# Patient Record
Sex: MALE | Race: BLACK OR AFRICAN AMERICAN | URBAN - METROPOLITAN AREA
[De-identification: names, ages, dates, MRNs, and addresses within clinical notes are randomized per-mention and may not be internally consistent; named-entity substitution may affect disease eponyms.]

---

## 2022-10-04 ENCOUNTER — OFFICE VISIT (OUTPATIENT)
Dept: URBAN - METROPOLITAN AREA CLINIC 86 | Facility: CLINIC | Age: 57
End: 2022-10-04

## 2022-10-04 ENCOUNTER — WEB ENCOUNTER (OUTPATIENT)
Dept: URBAN - METROPOLITAN AREA CLINIC 86 | Facility: CLINIC | Age: 57
End: 2022-10-04

## 2022-10-04 VITALS
HEART RATE: 131 BPM | BODY MASS INDEX: 21.35 KG/M2 | SYSTOLIC BLOOD PRESSURE: 90 MMHG | DIASTOLIC BLOOD PRESSURE: 67 MMHG | WEIGHT: 136 LBS | HEIGHT: 67 IN | TEMPERATURE: 96.4 F

## 2022-10-04 DIAGNOSIS — R16.0 HEPATOMEGALY: ICD-10-CM

## 2022-10-04 DIAGNOSIS — Z71.89 VACCINE COUNSELING: ICD-10-CM

## 2022-10-04 DIAGNOSIS — F10.10 ALCOHOL ABUSE: ICD-10-CM

## 2022-10-04 DIAGNOSIS — K76.0 FATTY LIVER: ICD-10-CM

## 2022-10-04 DIAGNOSIS — R63.4 WEIGHT LOSS: ICD-10-CM

## 2022-10-04 DIAGNOSIS — R74.8 ELEVATED LIVER ENZYMES: ICD-10-CM

## 2022-10-04 DIAGNOSIS — K92.1 HEMATOCHEZIA: ICD-10-CM

## 2022-10-04 DIAGNOSIS — K85.20 ALCOHOL-INDUCED ACUTE PANCREATITIS, UNSPECIFIED COMPLICATION STATUS: ICD-10-CM

## 2022-10-04 DIAGNOSIS — K70.10 ALCOHOLIC HEPATITIS WITHOUT ASCITES: ICD-10-CM

## 2022-10-04 RX ORDER — ROSUVASTATIN 40 MG/1
TABLET, FILM COATED ORAL
Qty: 30 TABLET | Status: ACTIVE | COMMUNITY

## 2022-10-04 RX ORDER — PANTOPRAZOLE SODIUM 40 MG/1
TABLET, DELAYED RELEASE ORAL
Qty: 30 TABLET | Status: ACTIVE | COMMUNITY

## 2022-10-04 RX ORDER — METOPROLOL SUCCINATE 50 MG/1
TABLET, FILM COATED, EXTENDED RELEASE ORAL
Qty: 30 TABLET | Status: ACTIVE | COMMUNITY

## 2022-10-04 RX ORDER — ONDANSETRON 4 MG/1
TABLET, FILM COATED ORAL
Qty: 18 TABLET | Status: ACTIVE | COMMUNITY

## 2022-10-04 RX ORDER — ESCITALOPRAM 20 MG/1
TABLET, FILM COATED ORAL
Qty: 30 TABLET | Status: ACTIVE | COMMUNITY

## 2022-10-04 RX ORDER — TAMSULOSIN HYDROCHLORIDE 0.4 MG/1
CAPSULE ORAL
Qty: 30 CAPSULE | Status: ACTIVE | COMMUNITY

## 2022-10-04 RX ORDER — FOLIC ACID 1 MG/1
TABLET ORAL
Qty: 30 TABLET | Status: ACTIVE | COMMUNITY

## 2022-10-04 RX ORDER — AMLODIPINE BESYLATE 10 MG/1
TAKE 1 TABLET BY MOUTH EVERY DAY TABLET ORAL
Qty: 30 EACH | Refills: 4 | Status: ACTIVE | COMMUNITY

## 2022-10-04 NOTE — HPI-TODAY'S VISIT:
This is a 57 year old males here for abnormal liver enzymes. Referred by Dr. Rossi Tao   A copy of the note will be sent to the referring provider.   10/4/22 office visit accompanied by his sons Mr. To is a 57-year-old male with a past medical history of depression, alcohol use disorder, pancreatitis, hypertension, CKD stage III.   Alcohol withdrawal history: Recently admitted 3 times in the past year.  April 22 he left AMA.  June 2022 he was admitted for pancreatitis and possible alcohol withdrawal. Recently admitted sept 10, 2022 as well. During this admission he had a maddreys score of 22, no steriods were done.   Abnormal liver enzymes were noted: 9/19/22 labs Labs Creatinine 1.21, sodium 135, potassium 4.4, , , alk phos 339, lipase 487 CBC with a white blood cell count of 11.8 hemoglobin 11.2, platelets 298.   9/2022 Liver: Diffuse increased echogenicity with hepatomegaly.  No lesions.  Portal vein patent.  Hepatic veins are patent.  Common bile duct measures 6 mm.  Gallbladder: Intraluminal gallbladder sludge.  No distention, wall thickening, or inflammation.  Pancreas: Normal. Spleen: normal. Right kidney measures 11.6 cm with normal echogenicity no hydronephrosis.Overall they thought that there is diffuse fatty liver with hepatomegaly  ETOH: none since last week prior drinking pint daily. Drinking alot over the past couple of years.   Patients medication list reviewed and no new meds not on supplements.  FH denies FH of liver disease  Of note he has also had significant weight loss over the past several months and hematochezia and in hospital they recomemnded a colonoscopy outpt

## 2022-10-07 ENCOUNTER — TELEPHONE ENCOUNTER (OUTPATIENT)
Dept: URBAN - METROPOLITAN AREA CLINIC 92 | Facility: CLINIC | Age: 57
End: 2022-10-07

## 2022-10-27 ENCOUNTER — OFFICE VISIT (OUTPATIENT)
Dept: URBAN - METROPOLITAN AREA CLINIC 92 | Facility: CLINIC | Age: 57
End: 2022-10-27

## 2022-10-27 NOTE — HPI-TODAY'S VISIT:
57-year-old male presents today with complaints of pancreatitis, weight loss.  He was seen by liver center on 10-4-2022.  It was noted patient has a history of depression, alcohol use disorder, pancreatitis, hypertension and chronic kidney disease stage III.  He was recently admitted 3 times in the past year for alcohol withdrawal it was at this visit abnormal liver enzymes were noted.  Ultrasound showed diffuse fatty liver with hepatomegaly.  It is noted that he was drinking a pint daily and has been drinking a lot over the past couple of years.  He does not have family history of liver disease he has lost a significant mount of weight over the last several months and has been having hematochezia and it was recommended that patient have outpatient colonoscopy

## 2022-10-28 ENCOUNTER — OFFICE VISIT (OUTPATIENT)
Dept: URBAN - METROPOLITAN AREA CLINIC 92 | Facility: CLINIC | Age: 57
End: 2022-10-28
Payer: COMMERCIAL

## 2022-10-28 VITALS
HEIGHT: 67 IN | SYSTOLIC BLOOD PRESSURE: 112 MMHG | BODY MASS INDEX: 24.96 KG/M2 | DIASTOLIC BLOOD PRESSURE: 81 MMHG | HEART RATE: 115 BPM | TEMPERATURE: 97.1 F | WEIGHT: 159 LBS

## 2022-10-28 DIAGNOSIS — Z12.11 SCREENING FOR COLON CANCER: ICD-10-CM

## 2022-10-28 DIAGNOSIS — R63.4 WEIGHT LOSS: ICD-10-CM

## 2022-10-28 DIAGNOSIS — K85.20 ALCOHOL-INDUCED ACUTE PANCREATITIS, UNSPECIFIED COMPLICATION STATUS: ICD-10-CM

## 2022-10-28 DIAGNOSIS — K70.10 ALCOHOLIC HEPATITIS WITHOUT ASCITES: ICD-10-CM

## 2022-10-28 DIAGNOSIS — R10.13 EPIGASTRIC PAIN: ICD-10-CM

## 2022-10-28 DIAGNOSIS — D64.9 ANEMIA, UNSPECIFIED TYPE: ICD-10-CM

## 2022-10-28 DIAGNOSIS — K92.1 MELENA: ICD-10-CM

## 2022-10-28 DIAGNOSIS — N17.9 ACUTE KIDNEY INJURY: ICD-10-CM

## 2022-10-28 DIAGNOSIS — K86.89 PANCREATIC INSUFFICIENCY: ICD-10-CM

## 2022-10-28 PROCEDURE — 99204 OFFICE O/P NEW MOD 45 MIN: CPT

## 2022-10-28 RX ORDER — ROSUVASTATIN 40 MG/1
TABLET, FILM COATED ORAL
Qty: 30 TABLET | Status: ACTIVE | COMMUNITY

## 2022-10-28 RX ORDER — AMLODIPINE BESYLATE 10 MG/1
TAKE 1 TABLET BY MOUTH EVERY DAY TABLET ORAL
Qty: 30 EACH | Refills: 4 | Status: ACTIVE | COMMUNITY

## 2022-10-28 RX ORDER — ONDANSETRON 4 MG/1
TABLET, FILM COATED ORAL
Qty: 18 TABLET | Status: ACTIVE | COMMUNITY

## 2022-10-28 RX ORDER — FOLIC ACID 1 MG/1
TABLET ORAL
Qty: 30 TABLET | Status: ACTIVE | COMMUNITY

## 2022-10-28 RX ORDER — ESCITALOPRAM 20 MG/1
TABLET, FILM COATED ORAL
Qty: 30 TABLET | Status: ACTIVE | COMMUNITY

## 2022-10-28 RX ORDER — TAMSULOSIN HYDROCHLORIDE 0.4 MG/1
CAPSULE ORAL
Qty: 30 CAPSULE | Status: ACTIVE | COMMUNITY

## 2022-10-28 RX ORDER — PANTOPRAZOLE SODIUM 40 MG/1
TABLET, DELAYED RELEASE ORAL
Qty: 30 TABLET | Status: ACTIVE | COMMUNITY

## 2022-10-28 RX ORDER — METOPROLOL SUCCINATE 50 MG/1
TABLET, FILM COATED, EXTENDED RELEASE ORAL
Qty: 30 TABLET | Status: ACTIVE | COMMUNITY

## 2022-10-28 NOTE — HPI-TODAY'S VISIT:
57-year-old male presents today with complaints of pancreatitis, weight loss.  He was seen by liver center on 10-4-2022.  It was noted patient has a history of depression, alcohol use disorder, pancreatitis, hypertension and chronic kidney disease stage III.  He was recently admitted 3 times in the past year for alcohol withdrawal it was at the last visit(9/20220) abnormal liver enzymes were noted.  Ultrasound showed diffuse fatty liver with hepatomegaly.  It is noted that he was drinking a pint daily and has been drinking a lot over the past couple of years.  He does not have family history of liver disease. He has lost a significant mount of weight over the last several months and has been having hematochezia and it was recommended that patient have outpatient colonoscopy  He most recently presented to [facility] on 10- after his primary care was concerned about acute renal injury.  He was in the hospital until 10-21-22.  Discharge summary notes nephrology was consulted due to hyponatremia, hyperkalemia, dehydration and acute renal injury which had resolved. Patient was told to follow-up with outpatient nephro.  He was also noted to have diarrhea and it was noted that fecal elastase was 113 in June and Creon was given to patient which he states made his diarrhea better.   Stool culture was negative. He was also found to have macrocytic anemia he denied any prior  blood loss. Over the course of hospitalization hemoglobin would fluctuate below and above 7 and he was given 1 unit of blood on 10-20. He is taking thiamine and folate supplementation his last drink was about a month ago and he had no signs of alcohol withdrawal.  He did not note any abdominal pain but did note ongoing diarrhea.  CT of abdomen and pelvis showed no acute findings of pancreatitis.Lipase was elevated at 380 but is better than prior admission in September which was 487.  Last hemoglobin was 8.3, hematocrit 26.2, MCV 95.6, iron 55 ferritin 1200.  Patient has not yet followed up with nephrology and would like a referral today.  He states that he has some epigastric abdominal pain that comes and goes since he got out of the hospital on Friday.  He did note a vomiting episode yesterday but denies hematemesis.  Prior to this last episode he vomited in the hospital.  He denies any nausea, dysphagia, odynophagia, uses NSAIDs rarely, heartburn, regurgitation.  It has been noted that he has been losing weight in the past he states prior to his pancreatitis episodes he weighed 175 and in the hospital weight 140 today he is back up to 159. He states he has a loss of appetite now. He is having a bowel movement every time after he eats and all of his stool are loose.  He was taking Creon in the hospital but has intermittently been taking it when he came home.  He does have his prescription at home when he does take Creon his diarrhea is much better.  Prior to this year he states he would have 1 normal bowel movement daily.  It has been noted in multiple notes that he has had hematochezia in the past but when I asked him today he states he has never seen blood in his stool.  He does state he has some dark stool when he wipes that has been present.  He also notes increased fatigue.  He was given pantoprazole 40 mg which she does not know if he is taking or not but he does he has a prescription at home.

## 2022-10-29 LAB
FERRITIN, SERUM: 709
FOLATE (FOLIC ACID), SERUM: 8.9
HEMATOCRIT: 32
HEMOGLOBIN: 10.6
IRON BIND.CAP.(TIBC): 151
IRON SATURATION: 38
IRON: 57
LIPASE: 209
MCH: 30.6
MCHC: 33.1
MCV: 92.5
MPV: 10.8
PLATELET COUNT: 493
RDW: 14
RED BLOOD CELL COUNT: 3.46
VITAMIN B12: 600
WHITE BLOOD CELL COUNT: 16.3

## 2022-10-31 ENCOUNTER — TELEPHONE ENCOUNTER (OUTPATIENT)
Dept: URBAN - METROPOLITAN AREA CLINIC 92 | Facility: CLINIC | Age: 57
End: 2022-10-31

## 2022-10-31 ENCOUNTER — OFFICE VISIT (OUTPATIENT)
Dept: URBAN - METROPOLITAN AREA SURGERY CENTER 16 | Facility: SURGERY CENTER | Age: 57
End: 2022-10-31

## 2022-10-31 RX ORDER — ROSUVASTATIN 40 MG/1
TABLET, FILM COATED ORAL
Qty: 30 TABLET | Status: ACTIVE | COMMUNITY

## 2022-10-31 RX ORDER — ONDANSETRON 4 MG/1
TABLET, FILM COATED ORAL
Qty: 18 TABLET | Status: ACTIVE | COMMUNITY

## 2022-10-31 RX ORDER — METOPROLOL SUCCINATE 50 MG/1
TABLET, FILM COATED, EXTENDED RELEASE ORAL
Qty: 30 TABLET | Status: ACTIVE | COMMUNITY

## 2022-10-31 RX ORDER — AMLODIPINE BESYLATE 10 MG/1
TAKE 1 TABLET BY MOUTH EVERY DAY TABLET ORAL
Qty: 30 EACH | Refills: 4 | Status: ACTIVE | COMMUNITY

## 2022-10-31 RX ORDER — PANTOPRAZOLE SODIUM 40 MG/1
TABLET, DELAYED RELEASE ORAL
Qty: 30 TABLET | Status: ACTIVE | COMMUNITY

## 2022-10-31 RX ORDER — FOLIC ACID 1 MG/1
TABLET ORAL
Qty: 30 TABLET | Status: ACTIVE | COMMUNITY

## 2022-10-31 RX ORDER — ONDANSETRON HYDROCHLORIDE 8 MG/1
1-2 CAPSULE TABLET, FILM COATED ORAL
Qty: 6 | Refills: 0 | OUTPATIENT
Start: 2022-10-31 | End: 2022-11-01

## 2022-10-31 RX ORDER — ESCITALOPRAM 20 MG/1
TABLET, FILM COATED ORAL
Qty: 30 TABLET | Status: ACTIVE | COMMUNITY

## 2022-10-31 RX ORDER — TAMSULOSIN HYDROCHLORIDE 0.4 MG/1
CAPSULE ORAL
Qty: 30 CAPSULE | Status: ACTIVE | COMMUNITY

## 2022-10-31 RX ORDER — POLYETHYLENE GLYCOL 3350, SODIUM SULFATE ANHYDROUS, SODIUM BICARBONATE, SODIUM CHLORIDE, POTASSIUM CHLORIDE 236; 22.74; 6.74; 5.86; 2.97 G/4L; G/4L; G/4L; G/4L; G/4L
AS DIRECTED POWDER, FOR SOLUTION ORAL ONCE
Qty: 1 UNSPECIFIED | Refills: 0 | OUTPATIENT

## 2022-11-15 ENCOUNTER — LAB OUTSIDE AN ENCOUNTER (OUTPATIENT)
Dept: URBAN - METROPOLITAN AREA CLINIC 86 | Facility: CLINIC | Age: 57
End: 2022-11-15

## 2022-11-18 ENCOUNTER — OFFICE VISIT (OUTPATIENT)
Dept: URBAN - METROPOLITAN AREA CLINIC 86 | Facility: CLINIC | Age: 57
End: 2022-11-18

## 2022-11-18 ENCOUNTER — DASHBOARD ENCOUNTERS (OUTPATIENT)
Age: 57
End: 2022-11-18

## 2022-11-18 PROBLEM — 235942001: Status: ACTIVE | Noted: 2022-10-04

## 2022-11-18 PROBLEM — 197321007: Status: ACTIVE | Noted: 2022-10-04

## 2022-11-18 PROBLEM — 89362005: Status: ACTIVE | Noted: 2022-10-04

## 2022-11-18 PROBLEM — 409063005 COUNSELING: Status: ACTIVE | Noted: 2022-10-04

## 2022-11-18 PROBLEM — 80515008: Status: ACTIVE | Noted: 2022-10-04

## 2022-11-18 PROBLEM — 449341000124102: Status: ACTIVE | Noted: 2022-10-04

## 2022-11-18 PROBLEM — 15167005: Status: ACTIVE | Noted: 2022-10-04

## 2022-11-18 PROBLEM — 707724006 ELEVATED LIVER ENZYMES LEVEL: Status: ACTIVE | Noted: 2022-10-04

## 2022-11-18 PROBLEM — 235875008: Status: ACTIVE | Noted: 2022-10-04

## 2022-11-18 RX ORDER — ESCITALOPRAM 20 MG/1
TABLET, FILM COATED ORAL
Qty: 30 TABLET | Status: ACTIVE | COMMUNITY

## 2022-11-18 RX ORDER — ONDANSETRON 4 MG/1
TABLET, FILM COATED ORAL
Qty: 18 TABLET | Status: ACTIVE | COMMUNITY

## 2022-11-18 RX ORDER — PANTOPRAZOLE SODIUM 40 MG/1
TABLET, DELAYED RELEASE ORAL
Qty: 30 TABLET | Status: ACTIVE | COMMUNITY

## 2022-11-18 RX ORDER — POLYETHYLENE GLYCOL 3350, SODIUM SULFATE ANHYDROUS, SODIUM BICARBONATE, SODIUM CHLORIDE, POTASSIUM CHLORIDE 236; 22.74; 6.74; 5.86; 2.97 G/4L; G/4L; G/4L; G/4L; G/4L
AS DIRECTED POWDER, FOR SOLUTION ORAL ONCE
Qty: 1 UNSPECIFIED | Refills: 0 | Status: ACTIVE | COMMUNITY

## 2022-11-18 RX ORDER — ROSUVASTATIN 40 MG/1
TABLET, FILM COATED ORAL
Qty: 30 TABLET | Status: ACTIVE | COMMUNITY

## 2022-11-18 RX ORDER — METOPROLOL SUCCINATE 50 MG/1
TABLET, FILM COATED, EXTENDED RELEASE ORAL
Qty: 30 TABLET | Status: ACTIVE | COMMUNITY

## 2022-11-18 RX ORDER — AMLODIPINE BESYLATE 10 MG/1
TAKE 1 TABLET BY MOUTH EVERY DAY TABLET ORAL
Qty: 30 EACH | Refills: 4 | Status: ACTIVE | COMMUNITY

## 2022-11-18 RX ORDER — FOLIC ACID 1 MG/1
TABLET ORAL
Qty: 30 TABLET | Status: ACTIVE | COMMUNITY

## 2022-11-18 RX ORDER — TAMSULOSIN HYDROCHLORIDE 0.4 MG/1
CAPSULE ORAL
Qty: 30 CAPSULE | Status: ACTIVE | COMMUNITY

## 2022-11-18 NOTE — HPI-TODAY'S VISIT:
L ankle elevated and ice-pack applied   This is a 57 year old males here for abnormal liver enzymes. Referred by Dr. Rossi Tao   A copy of the note will be sent to the referring provider.   11/17/22 10/28 labs October 28 labs with a white lipase of 209, white blood cell count 16.3, red blood cell count 3.4, hemoglobin 10.6, platelets 493 which is elevated please share with your primary care  Iron total 57, binding capacity 151 which is low and percent saturation 38%  Ferritin 79 and this is elevated vitamin B12 600 folate is 8.9   10/4/22 office visit accompanied by his sons Mr. To is a 57-year-old male with a past medical history of depression, alcohol use disorder, pancreatitis, hypertension, CKD stage III.   Alcohol withdrawal history: Recently admitted 3 times in the past year.  April 22 he left AMA.  June 2022 he was admitted for pancreatitis and possible alcohol withdrawal. Recently admitted sept 10, 2022 as well. During this admission he had a maddreys score of 22, no steriods were done.   Abnormal liver enzymes were noted: 9/19/22 labs Labs Creatinine 1.21, sodium 135, potassium 4.4, , , alk phos 339, lipase 487 CBC with a white blood cell count of 11.8 hemoglobin 11.2, platelets 298.   9/2022 Liver: Diffuse increased echogenicity with hepatomegaly.  No lesions.  Portal vein patent.  Hepatic veins are patent.  Common bile duct measures 6 mm.  Gallbladder: Intraluminal gallbladder sludge.  No distention, wall thickening, or inflammation.  Pancreas: Normal. Spleen: normal. Right kidney measures 11.6 cm with normal echogenicity no hydronephrosis.Overall they thought that there is diffuse fatty liver with hepatomegaly  ETOH: none since last week prior drinking pint daily. Drinking alot over the past couple of years.   Patients medication list reviewed and no new meds not on supplements.  FH denies FH of liver disease  Of note he has also had significant weight loss over the past several months and hematochezia and in hospital they recomemnded a colonoscopy outpt

## 2022-12-08 ENCOUNTER — CLAIMS CREATED FROM THE CLAIM WINDOW (OUTPATIENT)
Dept: URBAN - METROPOLITAN AREA CLINIC 4 | Facility: CLINIC | Age: 57
End: 2022-12-08
Payer: COMMERCIAL

## 2022-12-08 ENCOUNTER — OFFICE VISIT (OUTPATIENT)
Dept: URBAN - METROPOLITAN AREA SURGERY CENTER 16 | Facility: SURGERY CENTER | Age: 57
End: 2022-12-08

## 2022-12-08 ENCOUNTER — CLAIMS CREATED FROM THE CLAIM WINDOW (OUTPATIENT)
Dept: URBAN - METROPOLITAN AREA SURGERY CENTER 16 | Facility: SURGERY CENTER | Age: 57
End: 2022-12-08
Payer: COMMERCIAL

## 2022-12-08 DIAGNOSIS — K22.70 BARRETT ESOPHAGUS: ICD-10-CM

## 2022-12-08 DIAGNOSIS — K31.89 OTHER DISEASES OF STOMACH AND DUODENUM: ICD-10-CM

## 2022-12-08 DIAGNOSIS — K92.1 ACUTE MELENA: ICD-10-CM

## 2022-12-08 PROCEDURE — G8907 PT DOC NO EVENTS ON DISCHARG: HCPCS | Performed by: INTERNAL MEDICINE

## 2022-12-08 PROCEDURE — 43239 EGD BIOPSY SINGLE/MULTIPLE: CPT | Performed by: INTERNAL MEDICINE

## 2022-12-08 PROCEDURE — 88305 TISSUE EXAM BY PATHOLOGIST: CPT | Performed by: PATHOLOGY

## 2022-12-08 RX ORDER — ROSUVASTATIN 40 MG/1
TABLET, FILM COATED ORAL
Qty: 30 TABLET | Status: ACTIVE | COMMUNITY

## 2022-12-08 RX ORDER — POLYETHYLENE GLYCOL 3350, SODIUM SULFATE ANHYDROUS, SODIUM BICARBONATE, SODIUM CHLORIDE, POTASSIUM CHLORIDE 236; 22.74; 6.74; 5.86; 2.97 G/4L; G/4L; G/4L; G/4L; G/4L
AS DIRECTED POWDER, FOR SOLUTION ORAL ONCE
Qty: 1 UNSPECIFIED | Refills: 0 | Status: ACTIVE | COMMUNITY

## 2022-12-08 RX ORDER — PANTOPRAZOLE SODIUM 40 MG/1
TABLET, DELAYED RELEASE ORAL
Qty: 30 TABLET | Status: ACTIVE | COMMUNITY

## 2022-12-08 RX ORDER — ONDANSETRON 4 MG/1
TABLET, FILM COATED ORAL
Qty: 18 TABLET | Status: ACTIVE | COMMUNITY

## 2022-12-08 RX ORDER — ESCITALOPRAM 20 MG/1
TABLET, FILM COATED ORAL
Qty: 30 TABLET | Status: ACTIVE | COMMUNITY

## 2022-12-08 RX ORDER — AMLODIPINE BESYLATE 10 MG/1
TAKE 1 TABLET BY MOUTH EVERY DAY TABLET ORAL
Qty: 30 EACH | Refills: 4 | Status: ACTIVE | COMMUNITY

## 2022-12-08 RX ORDER — TAMSULOSIN HYDROCHLORIDE 0.4 MG/1
CAPSULE ORAL
Qty: 30 CAPSULE | Status: ACTIVE | COMMUNITY

## 2022-12-08 RX ORDER — FOLIC ACID 1 MG/1
TABLET ORAL
Qty: 30 TABLET | Status: ACTIVE | COMMUNITY

## 2022-12-08 RX ORDER — METOPROLOL SUCCINATE 50 MG/1
TABLET, FILM COATED, EXTENDED RELEASE ORAL
Qty: 30 TABLET | Status: ACTIVE | COMMUNITY

## 2023-01-05 ENCOUNTER — TELEPHONE ENCOUNTER (OUTPATIENT)
Dept: URBAN - METROPOLITAN AREA CLINIC 92 | Facility: CLINIC | Age: 58
End: 2023-01-05

## 2023-01-18 ENCOUNTER — TELEPHONE ENCOUNTER (OUTPATIENT)
Dept: URBAN - METROPOLITAN AREA CLINIC 92 | Facility: CLINIC | Age: 58
End: 2023-01-18

## 2023-01-20 ENCOUNTER — TELEPHONE ENCOUNTER (OUTPATIENT)
Dept: URBAN - METROPOLITAN AREA CLINIC 92 | Facility: CLINIC | Age: 58
End: 2023-01-20

## 2023-01-20 RX ORDER — POLYETHYLENE GLYCOL 3350, SODIUM SULFATE ANHYDROUS, SODIUM BICARBONATE, SODIUM CHLORIDE, POTASSIUM CHLORIDE 236; 22.74; 6.74; 5.86; 2.97 G/4L; G/4L; G/4L; G/4L; G/4L
AS DIRECTED POWDER, FOR SOLUTION ORAL ONCE
Qty: 1 UNSPECIFIED | Refills: 0
End: 2023-01-21

## 2023-01-23 ENCOUNTER — OFFICE VISIT (OUTPATIENT)
Dept: URBAN - METROPOLITAN AREA SURGERY CENTER 16 | Facility: SURGERY CENTER | Age: 58
End: 2023-01-23

## 2023-06-14 ENCOUNTER — OUT OF OFFICE VISIT (OUTPATIENT)
Dept: URBAN - METROPOLITAN AREA MEDICAL CENTER 39 | Facility: MEDICAL CENTER | Age: 58
End: 2023-06-14
Payer: COMMERCIAL

## 2023-06-14 DIAGNOSIS — K72.10 CHRONIC HEPATIC FAILURE WITHOUT COMA: ICD-10-CM

## 2023-06-14 DIAGNOSIS — R18.8 ABDOMINAL ASCITES: ICD-10-CM

## 2023-06-14 DIAGNOSIS — K76.82 ACUTE HEPATIC ENCEPHALOPATHY: ICD-10-CM

## 2023-06-14 DIAGNOSIS — E72.20 DISORDER OF UREA CYCLE METABOLISM, UNSPECIFIED: ICD-10-CM

## 2023-06-14 PROCEDURE — 99254 IP/OBS CNSLTJ NEW/EST MOD 60: CPT | Performed by: INTERNAL MEDICINE

## 2023-06-14 PROCEDURE — 99232 SBSQ HOSP IP/OBS MODERATE 35: CPT | Performed by: INTERNAL MEDICINE

## 2023-07-25 ENCOUNTER — OUT OF OFFICE VISIT (OUTPATIENT)
Dept: URBAN - METROPOLITAN AREA MEDICAL CENTER 24 | Facility: MEDICAL CENTER | Age: 58
End: 2023-07-25
Payer: COMMERCIAL

## 2023-07-25 DIAGNOSIS — D64.89 ANEMIA DUE TO OTHER CAUSE: ICD-10-CM

## 2023-07-25 DIAGNOSIS — K76.82 ACUTE HEPATIC ENCEPHALOPATHY: ICD-10-CM

## 2023-07-25 DIAGNOSIS — F10.10 AA (ALCOHOL ABUSE): ICD-10-CM

## 2023-07-25 DIAGNOSIS — K70.30 ALC (ALCOHOLIC LIVER CIRRHOSIS): ICD-10-CM

## 2023-07-25 PROCEDURE — G8427 DOCREV CUR MEDS BY ELIG CLIN: HCPCS | Performed by: INTERNAL MEDICINE

## 2023-07-25 PROCEDURE — 99232 SBSQ HOSP IP/OBS MODERATE 35: CPT | Performed by: INTERNAL MEDICINE

## 2023-07-25 PROCEDURE — 99222 1ST HOSP IP/OBS MODERATE 55: CPT | Performed by: INTERNAL MEDICINE

## 2023-07-28 ENCOUNTER — OUT OF OFFICE VISIT (OUTPATIENT)
Dept: URBAN - METROPOLITAN AREA MEDICAL CENTER 24 | Facility: MEDICAL CENTER | Age: 58
End: 2023-07-28
Payer: COMMERCIAL

## 2023-07-28 DIAGNOSIS — F10.10 AA (ALCOHOL ABUSE): ICD-10-CM

## 2023-07-28 DIAGNOSIS — K70.31 ALCOHOLIC CIRRHOSIS OF LIVER WITH ASCITES: ICD-10-CM

## 2023-07-28 DIAGNOSIS — K76.82 ACUTE HEPATIC ENCEPHALOPATHY: ICD-10-CM

## 2023-07-28 DIAGNOSIS — D64.89 ANEMIA DUE TO OTHER CAUSE: ICD-10-CM

## 2023-07-28 PROCEDURE — 99232 SBSQ HOSP IP/OBS MODERATE 35: CPT | Performed by: INTERNAL MEDICINE
